# Patient Record
Sex: FEMALE | Race: WHITE
[De-identification: names, ages, dates, MRNs, and addresses within clinical notes are randomized per-mention and may not be internally consistent; named-entity substitution may affect disease eponyms.]

---

## 2019-11-14 ENCOUNTER — HOSPITAL ENCOUNTER (OUTPATIENT)
Dept: HOSPITAL 8 - STAR | Age: 39
Discharge: HOME | End: 2019-11-14
Attending: SURGERY
Payer: COMMERCIAL

## 2019-11-14 DIAGNOSIS — F17.200: ICD-10-CM

## 2019-11-14 DIAGNOSIS — F12.10: ICD-10-CM

## 2019-11-14 DIAGNOSIS — Z01.818: Primary | ICD-10-CM

## 2019-11-14 LAB
<PLATELET ESTIMATE>: ADEQUATE
ALBUMIN SERPL-MCNC: 4.3 G/DL (ref 3.4–5)
ALP SERPL-CCNC: 54 U/L (ref 45–117)
ALT SERPL-CCNC: 46 U/L (ref 12–78)
ANION GAP SERPL CALC-SCNC: 5 MMOL/L (ref 5–15)
BAND#(MANUAL): 0.07 X10^3/UL
BASOPHILS NFR BLD MANUAL: 2 % (ref 0–1)
BASOS#(MANUAL): 0.14 X10^3/UL (ref 0–0.1)
BILIRUB SERPL-MCNC: 0.4 MG/DL (ref 0.2–1)
CALCIUM SERPL-MCNC: 9 MG/DL (ref 8.5–10.1)
CHLORIDE SERPL-SCNC: 108 MMOL/L (ref 98–107)
CREAT SERPL-MCNC: 1.06 MG/DL (ref 0.55–1.02)
ERYTHROCYTE [DISTWIDTH] IN BLOOD BY AUTOMATED COUNT: 13.7 % (ref 9.6–15.2)
LYMPH#(MANUAL): 1.87 X10^3/UL (ref 1–3.4)
LYMPHS% (MANUAL): 26 % (ref 22–44)
MACROCYTES BLD QL SMEAR: (no result)
MCH RBC QN AUTO: 38.2 PG (ref 27–34.8)
MCHC RBC AUTO-ENTMCNC: 33.5 G/DL (ref 32.4–35.8)
MCV RBC AUTO: 114.2 FL (ref 80–100)
MD: YES
MONOS#(MANUAL): 0.5 X10^3/UL (ref 0.3–2.7)
MONOS% (MANUAL): 7 % (ref 2–9)
NEUTS BAND NFR BLD: 1 % (ref 0–7)
PLATELET # BLD AUTO: 270 X10^3/UL (ref 130–400)
PMV BLD AUTO: 7.3 FL (ref 7.4–10.4)
PROT SERPL-MCNC: 7.7 G/DL (ref 6.4–8.2)
RBC # BLD AUTO: 3.65 X10^6/UL (ref 3.82–5.3)
SEG#(MANUAL): 4.61 X10^3/UL (ref 1.8–6.8)
SEGS% (MANUAL): 64 % (ref 42–75)
SMALL PLATELETS BLD QL SMEAR: (no result)

## 2019-11-14 PROCEDURE — 80053 COMPREHEN METABOLIC PANEL: CPT

## 2019-11-14 PROCEDURE — 93005 ELECTROCARDIOGRAM TRACING: CPT

## 2019-11-14 PROCEDURE — 36415 COLL VENOUS BLD VENIPUNCTURE: CPT

## 2019-11-14 PROCEDURE — 85025 COMPLETE CBC W/AUTO DIFF WBC: CPT

## 2019-11-18 ENCOUNTER — HOSPITAL ENCOUNTER (OUTPATIENT)
Dept: HOSPITAL 8 - OR | Age: 39
LOS: 1 days | Discharge: HOME | End: 2019-11-19
Attending: SURGERY
Payer: COMMERCIAL

## 2019-11-18 VITALS — BODY MASS INDEX: 27.11 KG/M2 | WEIGHT: 164.69 LBS | HEIGHT: 65.5 IN

## 2019-11-18 VITALS — SYSTOLIC BLOOD PRESSURE: 115 MMHG | DIASTOLIC BLOOD PRESSURE: 78 MMHG

## 2019-11-18 VITALS — DIASTOLIC BLOOD PRESSURE: 91 MMHG | SYSTOLIC BLOOD PRESSURE: 143 MMHG

## 2019-11-18 DIAGNOSIS — F32.9: ICD-10-CM

## 2019-11-18 DIAGNOSIS — T85.79XA: Primary | ICD-10-CM

## 2019-11-18 DIAGNOSIS — J45.909: ICD-10-CM

## 2019-11-18 DIAGNOSIS — Z88.8: ICD-10-CM

## 2019-11-18 DIAGNOSIS — L02.211: ICD-10-CM
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
## 2019-11-18 LAB — HCG UR SG: 1.02 (ref 1–1.03)

## 2019-11-18 PROCEDURE — 87075 CULTR BACTERIA EXCEPT BLOOD: CPT

## 2019-11-18 PROCEDURE — 86850 RBC ANTIBODY SCREEN: CPT

## 2019-11-18 PROCEDURE — 86900 BLOOD TYPING SEROLOGIC ABO: CPT

## 2019-11-18 PROCEDURE — 36415 COLL VENOUS BLD VENIPUNCTURE: CPT

## 2019-11-18 PROCEDURE — 87102 FUNGUS ISOLATION CULTURE: CPT

## 2019-11-18 PROCEDURE — 10180 I&D COMPLEX PO WOUND INFCTJ: CPT

## 2019-11-18 PROCEDURE — 81025 URINE PREGNANCY TEST: CPT

## 2019-11-18 PROCEDURE — 11008 RMV PRSTC MTRL/MESH ABD WALL: CPT

## 2019-11-18 PROCEDURE — 88305 TISSUE EXAM BY PATHOLOGIST: CPT

## 2019-11-18 PROCEDURE — 87205 SMEAR GRAM STAIN: CPT

## 2019-11-18 PROCEDURE — 87206 SMEAR FLUORESCENT/ACID STAI: CPT

## 2019-11-18 PROCEDURE — 87070 CULTURE OTHR SPECIMN AEROBIC: CPT

## 2019-11-18 PROCEDURE — 87116 MYCOBACTERIA CULTURE: CPT

## 2019-11-18 RX ADMIN — SODIUM CHLORIDE, PRESERVATIVE FREE SCH ML: 5 INJECTION INTRAVENOUS at 21:00

## 2019-11-18 RX ADMIN — SODIUM CHLORIDE, SODIUM LACTATE, POTASSIUM CHLORIDE, CALCIUM CHLORIDE, AND DEXTROSE MONOHYDRATE SCH MLS/HR: 600; 310; 30; 20; 5 INJECTION, SOLUTION INTRAVENOUS at 21:04

## 2019-11-18 RX ADMIN — FENTANYL CITRATE PRN MCG: 50 INJECTION INTRAMUSCULAR; INTRAVENOUS at 16:39

## 2019-11-18 RX ADMIN — OXYCODONE HYDROCHLORIDE PRN MG: 5 SOLUTION ORAL at 16:53

## 2019-11-18 RX ADMIN — FENTANYL CITRATE PRN MCG: 50 INJECTION INTRAMUSCULAR; INTRAVENOUS at 16:45

## 2019-11-18 RX ADMIN — OXYCODONE HYDROCHLORIDE PRN MG: 5 SOLUTION ORAL at 16:43

## 2019-11-19 VITALS — DIASTOLIC BLOOD PRESSURE: 87 MMHG | SYSTOLIC BLOOD PRESSURE: 145 MMHG

## 2019-11-19 VITALS — SYSTOLIC BLOOD PRESSURE: 117 MMHG | DIASTOLIC BLOOD PRESSURE: 79 MMHG

## 2019-11-19 VITALS — SYSTOLIC BLOOD PRESSURE: 112 MMHG | DIASTOLIC BLOOD PRESSURE: 79 MMHG

## 2019-11-19 RX ADMIN — CEFAZOLIN SODIUM SCH MLS/HR: 2 SOLUTION INTRAVENOUS at 00:18

## 2019-11-19 RX ADMIN — SODIUM CHLORIDE, SODIUM LACTATE, POTASSIUM CHLORIDE, CALCIUM CHLORIDE, AND DEXTROSE MONOHYDRATE SCH MLS/HR: 600; 310; 30; 20; 5 INJECTION, SOLUTION INTRAVENOUS at 07:20

## 2019-11-19 RX ADMIN — CEFAZOLIN SODIUM SCH MLS/HR: 2 SOLUTION INTRAVENOUS at 07:42

## 2019-11-19 RX ADMIN — SODIUM CHLORIDE, PRESERVATIVE FREE SCH ML: 5 INJECTION INTRAVENOUS at 07:50

## 2020-03-06 ENCOUNTER — OFFICE VISIT (OUTPATIENT)
Dept: URGENT CARE | Facility: CLINIC | Age: 40
End: 2020-03-06
Payer: COMMERCIAL

## 2020-03-06 VITALS
SYSTOLIC BLOOD PRESSURE: 132 MMHG | OXYGEN SATURATION: 98 % | HEIGHT: 66 IN | RESPIRATION RATE: 14 BRPM | TEMPERATURE: 99.7 F | HEART RATE: 111 BPM | WEIGHT: 169 LBS | BODY MASS INDEX: 27.16 KG/M2 | DIASTOLIC BLOOD PRESSURE: 88 MMHG

## 2020-03-06 DIAGNOSIS — R68.89 FLU-LIKE SYMPTOMS: ICD-10-CM

## 2020-03-06 DIAGNOSIS — J02.9 SORE THROAT: ICD-10-CM

## 2020-03-06 LAB
FLUAV+FLUBV AG SPEC QL IA: NEGATIVE
INT CON NEG: NORMAL
INT CON NEG: NORMAL
INT CON POS: NORMAL
INT CON POS: NORMAL
S PYO AG THROAT QL: NEGATIVE

## 2020-03-06 PROCEDURE — 87880 STREP A ASSAY W/OPTIC: CPT | Performed by: FAMILY MEDICINE

## 2020-03-06 PROCEDURE — 87804 INFLUENZA ASSAY W/OPTIC: CPT | Performed by: FAMILY MEDICINE

## 2020-03-06 PROCEDURE — 99203 OFFICE O/P NEW LOW 30 MIN: CPT | Performed by: FAMILY MEDICINE

## 2020-03-06 RX ORDER — OSELTAMIVIR PHOSPHATE 75 MG/1
75 CAPSULE ORAL 2 TIMES DAILY
Qty: 10 CAP | Refills: 0 | Status: SHIPPED | OUTPATIENT
Start: 2020-03-06 | End: 2024-03-23

## 2020-03-06 RX ORDER — BUPROPION HYDROCHLORIDE 75 MG/1
75 TABLET ORAL 2 TIMES DAILY
COMMUNITY
End: 2024-03-23

## 2020-03-06 NOTE — LETTER
March 6, 2020         Patient: Maria G Maya   YOB: 1980   Date of Visit: 3/6/2020           To Whom it May Concern:    Maria G Maya was seen in my clinic on 3/6/2020.     If you have any questions or concerns, please don't hesitate to call.        Sincerely,           Meaghan Marr M.D.  Electronically Signed

## 2020-03-07 NOTE — PROGRESS NOTES
"Subjective:      Maria G Maya is a 40 y.o. female who presents with Flu Like Symptoms (x 2 days)            This is a new problem.  40-year-old presenting for evaluation of flulike symptoms for the past couple days.  And other family member at home had recently influenza.  She has history of mild intermittent asthma and has been uses her albuterol as needed.  Recently has not been needing to use it frequently.  Feels achy and chills, congestion and cough.      Review of Systems   All other systems reviewed and are negative.         Objective:     /88 (BP Location: Left arm, Patient Position: Sitting, BP Cuff Size: Large adult)   Pulse (!) 111   Temp 37.6 °C (99.7 °F) (Temporal)   Resp 14   Ht 1.664 m (5' 5.5\")   Wt 76.7 kg (169 lb)   SpO2 98%   Breastfeeding No   BMI 27.70 kg/m²      Physical Exam  Constitutional:       General: She is not in acute distress.     Appearance: She is not ill-appearing, toxic-appearing or diaphoretic.   HENT:      Head: Normocephalic and atraumatic.      Right Ear: Tympanic membrane, ear canal and external ear normal.      Left Ear: Tympanic membrane, ear canal and external ear normal.      Nose: Congestion present.      Mouth/Throat:      Mouth: Mucous membranes are moist.      Pharynx: Oropharynx is clear. Uvula midline. No pharyngeal swelling, oropharyngeal exudate, posterior oropharyngeal erythema or uvula swelling.      Tonsils: No tonsillar exudate or tonsillar abscesses.   Eyes:      Conjunctiva/sclera: Conjunctivae normal.   Neck:      Musculoskeletal: Neck supple.   Cardiovascular:      Rate and Rhythm: Regular rhythm. Tachycardia present.      Heart sounds: No murmur. No friction rub. No gallop.    Pulmonary:      Effort: Pulmonary effort is normal. No respiratory distress.      Breath sounds: No stridor. No wheezing, rhonchi or rales.   Lymphadenopathy:      Cervical: No cervical adenopathy.   Skin:     General: Skin is warm.      Coloration: Skin is not " jaundiced or pale.      Findings: No erythema or rash.   Neurological:      Mental Status: She is alert and oriented to person, place, and time.   Psychiatric:         Mood and Affect: Mood normal.            Results for orders placed or performed in visit on 03/06/20   POCT Rapid Strep A   Result Value Ref Range    Rapid Strep Screen Negative     Internal Control Positive Valid     Internal Control Negative Valid    POCT Influenza A/B   Result Value Ref Range    Rapid Influenza A-B Negative     Internal Control Positive Valid     Internal Control Negative Valid           Assessment/Plan:     ASSESSMENT:PLAN:  1. Flu-like symptoms  - oseltamivir (TAMIFLU) 75 MG Cap; Take 1 Cap by mouth 2 times a day.  Dispense: 10 Cap; Refill: 0    2. Sore throat  - POCT Rapid Strep A  - POCT Influenza A/B      She has a high likelihood of having influenza based on presentation and recent exposure and therefore recommend starting Tamiflu based on her history of mild intermittent asthma.  Plan per orders and instructions  Warning signs reviewed  Work/School Excuse given

## 2020-03-07 NOTE — PATIENT INSTRUCTIONS

## 2024-03-23 ENCOUNTER — APPOINTMENT (OUTPATIENT)
Dept: RADIOLOGY | Facility: MEDICAL CENTER | Age: 44
End: 2024-03-23
Attending: EMERGENCY MEDICINE
Payer: COMMERCIAL

## 2024-03-23 ENCOUNTER — HOSPITAL ENCOUNTER (EMERGENCY)
Facility: MEDICAL CENTER | Age: 44
End: 2024-03-23
Attending: EMERGENCY MEDICINE
Payer: COMMERCIAL

## 2024-03-23 VITALS
DIASTOLIC BLOOD PRESSURE: 108 MMHG | RESPIRATION RATE: 16 BRPM | HEART RATE: 80 BPM | HEIGHT: 65 IN | WEIGHT: 157.41 LBS | BODY MASS INDEX: 26.23 KG/M2 | OXYGEN SATURATION: 100 % | TEMPERATURE: 97.7 F | SYSTOLIC BLOOD PRESSURE: 154 MMHG
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
## 2024-03-23 DIAGNOSIS — S60.221A CONTUSION OF RIGHT HAND, INITIAL ENCOUNTER: ICD-10-CM

## 2024-03-23 PROCEDURE — 99284 EMERGENCY DEPT VISIT MOD MDM: CPT

## 2024-03-23 PROCEDURE — 73130 X-RAY EXAM OF HAND: CPT | Mod: RT

## 2024-03-23 RX ORDER — FERROUS SULFATE 325(65) MG
325 TABLET ORAL DAILY
COMMUNITY

## 2024-03-23 RX ORDER — IBUPROFEN 200 MG
400 TABLET ORAL
COMMUNITY

## 2024-03-23 RX ORDER — ALBUTEROL SULFATE 90 UG/1
2 AEROSOL, METERED RESPIRATORY (INHALATION) EVERY 6 HOURS PRN
COMMUNITY

## 2024-03-23 RX ORDER — ACETAMINOPHEN 500 MG
1000 TABLET ORAL
COMMUNITY

## 2024-03-23 RX ORDER — NAPROXEN SODIUM 220 MG
220 TABLET ORAL
COMMUNITY

## 2024-03-23 NOTE — ED TRIAGE NOTES
Pt presents with  from home for a mechanical GLF that occurred last night. Pt reports right hand swelling and bruising to palm. CMS intact. Pt A&Ox4 and ambulatory.   Last took naproxen last night 2100.

## 2024-03-23 NOTE — ED PROVIDER NOTES
"  ER Provider Note    Scribed for Salazar Valenzuela M.D. by Gonzalo Blankenship. 3/23/2024   9:10 AM    Primary Care Provider: Pcp Pt States None    CHIEF COMPLAINT  Chief Complaint   Patient presents with    Hand Injury     EXTERNAL RECORDS REVIEWED  Outpatient Notes Seen by [facility] in July 2023 for left thumb arthritis    HPI/ROS    LIMITATION TO HISTORY   Select: : None  OUTSIDE HISTORIAN(S):  Significant other  at bedside    Maria G Maya is a 44 y.o. female who presents to the ED for evaluation of injuries after a fall earlier today. She states that she was walking and miss a step on the curb, landing on her outstretched right hand. Since then she has noticed pain and swelling to that hand. She denies any head strike, or loss of consciousness. No other injuries.     PAST MEDICAL HISTORY  History reviewed. No pertinent past medical history.    SURGICAL HISTORY  Past Surgical History:   Procedure Laterality Date    OTHER ORTHOPEDIC SURGERY      right thumb joint replacement    UMBILICAL HERNIA REPAIR         FAMILY HISTORY  History reviewed. No pertinent family history.    SOCIAL HISTORY   reports that she has been smoking. She has never used smokeless tobacco. She reports current alcohol use. She reports that she does not currently use drugs.    CURRENT MEDICATIONS  Previous Medications    ALBUTEROL (PROVENTIL) 2.5 MG/0.5 ML NEBU SOLN    by Nebulization route ONCE (RT).    BUPROPION (WELLBUTRIN) 75 MG TAB    Take 75 mg by mouth 2 times a day.    OSELTAMIVIR (TAMIFLU) 75 MG CAP    Take 1 Cap by mouth 2 times a day.       ALLERGIES  Allergies   Allergen Reactions    Bactrim [Sulfamethoxazole W-Trimethoprim] Unspecified     Bijan sourav syndrome          PHYSICAL EXAM  BP (!) 165/115   Pulse 94   Temp 36.4 °C (97.6 °F) (Temporal)   Resp 12   Ht 1.651 m (5' 5\")   Wt 71.4 kg (157 lb 6.5 oz)   SpO2 99%   BMI 26.19 kg/m²    Nursing note and vitals reviewed.  Constitutional: Well-developed and " well-nourished. No distress.   HENT: Head is normocephalic and atraumatic. Oropharynx is clear and moist without exudate or erythema.   Eyes: Pupils are equal, round. Conjunctiva are normal.   Cardiovascular: Strong radial pulse. Capillary refill is less than 2 seconds distal to the point of injury.  Pulmonary/Chest: No respiratory distress. Chest wall is atraumatic.   Abdominal: Soft and non-tender. No distention. Atraumatic.    Musculoskeletal: Extremities exhibit normal range of motion there is tenderness bruising and swelling over the index and long finger of the right hand. Bilateral lower extremities are atraumatic. NVI distally.  Neurological: Awake, alert and oriented to person, place, and time. No focal deficits noted. Strength and sensation are normal distal to the point of injury.  Skin: Skin is warm and dry. No rash.   Psychiatric: Appropriate for clinical situation.     DIAGNOSTIC STUDIES    Radiology:   This attending emergency physician has independently interpreted the diagnostic imaging associated with this visit and is awaiting the final reading from the radiologist.   Preliminary interpretation is a follows: No fracture noted  Radiologist interpretation:   DX-HAND 3+ RIGHT   Final Result         No acute osseous abnormality.         INITIAL ASSESSMENT AND PLAN    9:10 AM - Patient was evaluated at bedside. She presents for a right hand injury after falling, no head strike or other injury. There is swelling and bruising to the region of trauma. Ordered for DX-right hand to evaluate.  Patient verbalizes understanding and support with my plan of care.      ED Observation Status? No; Patient does not meet criteria for ED Observation.      COURSE AND MEDICAL DECISION MAKING    9:43 AM - Patient was reevaluated at bedside. Discussed radiology results with the patient and informed them that there is no fracture noted. We will place her in a velcro splint and have her follow up with hand specialist in one  week for possible occult fracture. Patient will now be discharged at this time. Discussed return precautions and plan for at home care. Patient verbalizes understanding and agreement to this plan of care.             DISPOSITION AND DISCUSSIONS    I have discussed management of the patient with the following physicians and CARLEEN's:  None    Discussion of management with other QHP or appropriate source(s): None     Barriers to care at this time, including but not limited to: Patient does not have established PCP.     Decision tools and prescription drugs considered including, but not limited to: Pain Medications   .I considered prescribing narcotic pain medication, however I feel pain should be adequately controlled with over the counter NSAIDs.      DISPOSITION:  Patient will be discharged home in stable condition.    FOLLOW UP:  [facility], Emergency Dept  [address]  [place] [address] [phone]    If symptoms worsen    Luis M Pate M.D.  [address] [facility]  [place] [id number] [phone]    Schedule an appointment as soon as possible for a visit       FINAL DIAGNOSIS  1. Fall, initial encounter    2. Contusion of right hand, initial encounter         IGonzalo (Mary), am scribing for, and in the presence of, Salazar Valenzuela M.D..    Electronically signed by: Gonzalo Blankenship (Mary), 3/23/2024    ISalazar M.D. personally performed the services described in this documentation, as scribed by Gonzalo Blankenship in my presence, and it is both accurate and complete.      The note accurately reflects work and decisions made by me.  Salazar Valenzuela M.D.  3/23/2024  3:57 PM

## 2024-03-23 NOTE — ED NOTES
Pt provided with ice pack. Pillow provided for elevation. Pt is placed in position of comfort. Call light within reach.

## 2024-03-23 NOTE — ED NOTES
Dc instructions reviewed with pt and spouse including referral to hand specialist as well as establish with pcp (also referral for same) . Ice, elevate, otc meds for pain, return for worsening s/s

## 2024-03-23 NOTE — ED NOTES
Missed step from curb, fell forward and caught self with both hands. Bruising to the right side, minimal pain to the left. Denies striking head or LOC.

## 2024-08-19 ENCOUNTER — APPOINTMENT (OUTPATIENT)
Dept: MEDICAL GROUP | Facility: IMAGING CENTER | Age: 44
End: 2024-08-19
Payer: COMMERCIAL

## 2024-09-03 ENCOUNTER — APPOINTMENT (OUTPATIENT)
Dept: MEDICAL GROUP | Facility: IMAGING CENTER | Age: 44
End: 2024-09-03
Payer: COMMERCIAL

## 2024-09-03 VITALS
TEMPERATURE: 97.3 F | SYSTOLIC BLOOD PRESSURE: 150 MMHG | HEART RATE: 88 BPM | OXYGEN SATURATION: 100 % | HEIGHT: 65 IN | BODY MASS INDEX: 28.16 KG/M2 | WEIGHT: 169 LBS | DIASTOLIC BLOOD PRESSURE: 90 MMHG

## 2024-09-03 DIAGNOSIS — Z12.11 COLON CANCER SCREENING: ICD-10-CM

## 2024-09-03 DIAGNOSIS — N94.3 PMS (PREMENSTRUAL SYNDROME): ICD-10-CM

## 2024-09-03 DIAGNOSIS — Z00.00 WELLNESS EXAMINATION: ICD-10-CM

## 2024-09-03 DIAGNOSIS — I10 PRIMARY HYPERTENSION: Primary | ICD-10-CM

## 2024-09-03 DIAGNOSIS — Z12.4 SCREENING FOR CERVICAL CANCER: ICD-10-CM

## 2024-09-03 DIAGNOSIS — E66.3 OVERWEIGHT (BMI 25.0-29.9): ICD-10-CM

## 2024-09-03 DIAGNOSIS — N95.1 PERIMENOPAUSAL SYMPTOM: ICD-10-CM

## 2024-09-03 DIAGNOSIS — Z12.31 ENCOUNTER FOR SCREENING MAMMOGRAM FOR MALIGNANT NEOPLASM OF BREAST: ICD-10-CM

## 2024-09-03 DIAGNOSIS — R73.01 IMPAIRED FASTING BLOOD SUGAR: ICD-10-CM

## 2024-09-03 DIAGNOSIS — Z13.79 GENETIC TESTING: ICD-10-CM

## 2024-09-03 DIAGNOSIS — Z11.59 NEED FOR HEPATITIS C SCREENING TEST: ICD-10-CM

## 2024-09-03 DIAGNOSIS — Z23 NEED FOR VACCINATION: ICD-10-CM

## 2024-09-03 DIAGNOSIS — Z11.4 ENCOUNTER FOR SCREENING FOR HUMAN IMMUNODEFICIENCY VIRUS (HIV): ICD-10-CM

## 2024-09-03 DIAGNOSIS — Z97.5 IUD (INTRAUTERINE DEVICE) IN PLACE: ICD-10-CM

## 2024-09-03 PROCEDURE — 90715 TDAP VACCINE 7 YRS/> IM: CPT

## 2024-09-03 PROCEDURE — 3077F SYST BP >= 140 MM HG: CPT

## 2024-09-03 PROCEDURE — 90472 IMMUNIZATION ADMIN EACH ADD: CPT

## 2024-09-03 PROCEDURE — 99204 OFFICE O/P NEW MOD 45 MIN: CPT | Mod: 25

## 2024-09-03 PROCEDURE — 3080F DIAST BP >= 90 MM HG: CPT

## 2024-09-03 PROCEDURE — 90746 HEPB VACCINE 3 DOSE ADULT IM: CPT | Mod: JZ

## 2024-09-03 PROCEDURE — 90471 IMMUNIZATION ADMIN: CPT

## 2024-09-03 RX ORDER — OLMESARTAN MEDOXOMIL 20 MG/1
20 TABLET ORAL DAILY
Qty: 30 TABLET | Refills: 1 | Status: SHIPPED | OUTPATIENT
Start: 2024-09-03

## 2024-09-03 RX ORDER — SERTRALINE HYDROCHLORIDE 25 MG/1
25 TABLET, FILM COATED ORAL DAILY
Qty: 30 TABLET | Refills: 3 | Status: SHIPPED | OUTPATIENT
Start: 2024-09-03

## 2024-09-03 ASSESSMENT — ANXIETY QUESTIONNAIRES
3. WORRYING TOO MUCH ABOUT DIFFERENT THINGS: SEVERAL DAYS
6. BECOMING EASILY ANNOYED OR IRRITABLE: SEVERAL DAYS
GAD7 TOTAL SCORE: 8
4. TROUBLE RELAXING: SEVERAL DAYS
2. NOT BEING ABLE TO STOP OR CONTROL WORRYING: SEVERAL DAYS
5. BEING SO RESTLESS THAT IT IS HARD TO SIT STILL: NEARLY EVERY DAY
1. FEELING NERVOUS, ANXIOUS, OR ON EDGE: NOT AT ALL
7. FEELING AFRAID AS IF SOMETHING AWFUL MIGHT HAPPEN: SEVERAL DAYS

## 2024-09-03 ASSESSMENT — PATIENT HEALTH QUESTIONNAIRE - PHQ9
CLINICAL INTERPRETATION OF PHQ2 SCORE: 2
5. POOR APPETITE OR OVEREATING: 2 - MORE THAN HALF THE DAYS
SUM OF ALL RESPONSES TO PHQ QUESTIONS 1-9: 9

## 2024-09-03 NOTE — PROGRESS NOTES
Chief Complaint   Patient presents with    Establish Care    Hypertension    Other     Pre-mensurate syndrome x 6 mos    Pt report putting on weight 50 lbs     HISTORY OF THE PRESENT ILLNESS: Patient is a 44 y.o. female. This pleasant patient is here today to establish care and to discuss:    To establish care  No Previous PCP     Primary hypertension  New issue. She eats healthy, home cooked meals. She is active at work as a  but no regular exercise.   She does vape nicotine and THC  She drinks 8-12 wine a week.     Perimenopause  PMS  Onset of symptoms started 6 months ago. Pt reports symptoms of weight gain, irregular menses, severe depression and very emotional around menstrual cycle.   She is requesting referral to gynecology and to start drug therapy.   She reports PMS symptoms is starting to affect her relationship.         9/3/2024     2:40 PM   PHQ-9 Screening   Little interest or pleasure in doing things 1 - several days   Feeling down, depressed, or hopeless 1 - several days   Trouble falling or staying asleep, or sleeping too much 2 - more than half the days   Feeling tired or having little energy 0 - not at all   Poor appetite or overeating 2 - more than half the days   Feeling bad about yourself - or that you are a failure or have let yourself or your family down 1 - several days   Trouble concentrating on things, such as reading the newspaper or watching television 0 - not at all   Moving or speaking so slowly that other people could have noticed. Or the opposite - being so fidgety or restless that you have been moving around a lot more than usual 1 - several days   Thoughts that you would be better off dead, or of hurting yourself in some way 1 - several days   PHQ-2 Total Score 2   PHQ-9 Total Score 9       Interpretation of PHQ-9 Total Score   Score Severity   1-4 No Depression   5-9 Mild Depression   10-14 Moderate Depression   15-19 Moderately Severe Depression   20-27 Severe Depression          9/3/2024     3:04 PM    LILLI-7 ANXIETY SCALE FLOWSHEET   Feeling nervous, anxious, or on edge 0   Not being able to stop or control worrying 1   Worrying too much about different things 1   Trouble relaxing 1   Being so restless that it is hard to sit still 3   Becoming easily annoyed or irritable 1   Feeling afraid as if something awful might happen 1   LILLI-7 Total Score 8       Interpretation of LILLI-7 Total Score   Score Severity   0-4 Minimal Anxiety  5-9 Mild Anxiety   10-14 Moderate Anxiety  15-21 Severy Anxiety       Allergies: Bactrim [sulfamethoxazole w-trimethoprim]    Current Outpatient Medications Ordered in Epic   Medication Sig Dispense Refill    olmesartan (BENICAR) 20 MG Tab Take 1 Tablet by mouth every day. 30 Tablet 1    sertraline (ZOLOFT) 25 MG tablet Take 1 Tablet by mouth every day. 30 Tablet 3    diclofenac sodium (VOLTAREN) 1 % Gel Apply 4 g topically 3 times a day as needed (pain).      acetaminophen (TYLENOL) 500 MG Tab Take 1,000 mg by mouth 1 time a day as needed for Moderate Pain.      ibuprofen (MOTRIN) 200 MG Tab Take 400 mg by mouth 1 time a day as needed for Mild Pain.      naproxen (ALEVE) 220 MG tablet Take 220 mg by mouth 1 time a day as needed (pain).      multivitamin Tab Take 1 Tablet by mouth every day.      albuterol 108 (90 Base) MCG/ACT Aero Soln inhalation aerosol Inhale 2 Puffs every 6 hours as needed for Shortness of Breath.      CALCIUM PO Take 1 Tablet by mouth every day. (Patient not taking: Reported on 9/3/2024)      ferrous sulfate 325 (65 Fe) MG tablet Take 325 mg by mouth every day. (Patient not taking: Reported on 9/3/2024)       No current [facility]-ordered facility-administered medications on file.       History reviewed. No pertinent past medical history.    Past Surgical History:   Procedure Laterality Date    OTHER ORTHOPEDIC SURGERY      right thumb joint replacement    UMBILICAL HERNIA REPAIR         Social History     Tobacco Use    Smoking status:  "Former     Types: Cigarettes     Start date: 1/1/2024    Smokeless tobacco: Never   Vaping Use    Vaping status: Every Day    Substances: Nicotine, THC, CBD, Flavoring    Devices: Pre-filled or refillable cartridge   Substance Use Topics    Alcohol use: Yes     Comment: 8 -12 weekly    Drug use: Not Currently     Types: Marijuana, Inhaled       Social History     Social History Narrative    Not on file       Family History   Problem Relation Age of Onset    Cancer Maternal Grandfather        ROS:     - Constitutional: Negative for fever, chills, unexpected weight change, and fatigue/generalized weakness.     - HEENT: Negative for headaches, vision changes, hearing changes, ear pain, ear discharge, rhinorrhea, sinus congestion, sore throat, and neck pain.      - Respiratory: Negative for cough, sputum production, chest congestion, dyspnea, wheezing, and crackles.      - Cardiovascular: Negative for chest pain, palpitations, orthopnea, and bilateral lower extremity edema.     - Gastrointestinal: Negative for heartburn, nausea, vomiting, abdominal pain, hematochezia, melena, diarrhea, constipation, and greasy/foul-smelling stools.     - Genitourinary: Negative for dysuria, polyuria, hematuria, pyuria, urinary urgency, and urinary incontinence.     - Musculoskeletal: Negative for myalgias, back pain, and joint pain.     - Skin: Negative for rash, itching, cyanotic skin color change.     - Neurological: Negative for dizziness, tingling, tremors, focal sensory deficit, focal weakness and headaches.     - Endo/Heme/Allergies: Does not bruise/bleed easily.     - Psychiatric/Behavioral: Negative for  suicidal/homicidal ideation and memory loss.          Exam: BP (!) 150/90 (BP Location: Left arm, Patient Position: Sitting, BP Cuff Size: Adult)   Pulse 88   Temp 36.3 °C (97.3 °F) (Temporal)   Ht 1.651 m (5' 5\")   Wt 76.7 kg (169 lb)   SpO2 100%  Body mass index is 28.12 kg/m².    General: Normal appearing. No " distress.  HEENT: Normocephalic. Eyes conjunctiva clear lids without ptosis, pupils equal and reactive to light accommodation, ears normal shape and contour, canals are clear bilaterally, tympanic membranes are benign, nasal mucosa benign, oropharynx is without erythema, edema or exudates.   Neck: Supple without JVD or bruit. Thyroid is not enlarged.  Pulmonary: Clear to ausculation.  Normal effort. No rales, ronchi, or wheezing.  Cardiovascular: Regular rate and rhythm without murmur. Carotid and radial pulses are intact and equal bilaterally.  Abdomen: Soft, nontender, nondistended. Normal bowel sounds. Liver and spleen are not palpable  Neurologic: Grossly nonfocal  Lymph: No cervical, supraclavicular or axillary lymph nodes are palpable  Skin: Warm and dry.  No obvious lesions.  Musculoskeletal: Normal gait. No extremity cyanosis, clubbing, or edema.  Psych: Normal mood and affect. Alert and oriented x3. Judgment and insight is normal.    Please note that this dictation was created using voice recognition software. I have made every reasonable attempt to correct obvious errors, but I expect that there are errors of grammar and possibly content that I did not discover before finalizing the note.      Assessment/Plan    44 y.o. female with the following -    1. Wellness examination  PMH/PSH/FH/Social history reviewed. Medication reconciled. Vaccinations discussed. Previous records and labs reviewed. Discussed age appropriate anticipatory guidance.  Will screen for anemia, thyroid disorder, metabolic disorder, cardiovascular disease, diabetes, and vitamin deficiency. Will order labs.    - CBC WITH DIFFERENTIAL; Future  - Comp Metabolic Panel; Future  - Lipid Profile; Future  - TSH WITH REFLEX TO FT4; Future  - VITAMIN D,25 HYDROXY (DEFICIENCY); Future  - HEMOGLOBIN A1C; Future    2. Overweight (BMI 25.0-29.9)  To have successful weight loss, a comprehensive lifestyle intervention that include a combination of diet,  exercise, and behavioral modification is important.    Start today with diet changes: Reduce or eliminate carbonated drinks/sweetened drinks, food/drink with high fructose corn syrup, and processed foods.  Increase protein intake, 1 grams of protein per kg of body weight, increase fiber intake through vegetables, fresh fruits, and increase water intake  increase physical activity and start an exercise regimen. Adults should engage in at least         150 minutes/week of moderate-intensity or 75 minutes/week of vigorous-intensity physical         activity including resistance exercise     - CBC WITH DIFFERENTIAL; Future  - Comp Metabolic Panel; Future  - Lipid Profile; Future  - TSH WITH REFLEX TO FT4; Future  - VITAMIN D,25 HYDROXY (DEFICIENCY); Future  - HEMOGLOBIN A1C; Future    3. Primary hypertension  New dx. Will treat with olmesartan for BP control. Med administration and side effects discussed.   Recommend to monitor BP and keep log. BP goal >100/50 <140/80  S/s hypotension discussed.  Recommend therapeutic lifestyle changes that include healthy diet that is rich in vegetables, fruits, fiber-rich whole grains, lean meats, poultry and fish, low in saturated and trans fats, cholesterol, sodium, and added sugars (DASH and Mediterranean diet).   Regular exercise at least 30 minutes 5 times a week.   Weight reduction if applicable (aim for 5% to 10% body weight increments).   Smoking cessation. Limit alcohol consumption.   Stress-reduction techniques such as meditation.   Practice good sleep hygiene.     - CBC WITH DIFFERENTIAL; Future  - Comp Metabolic Panel; Future  - Lipid Profile; Future  - TSH WITH REFLEX TO FT4; Future  - HEMOGLOBIN A1C; Future  - MICROALBUMIN CREAT RATIO URINE; Future  - olmesartan (BENICAR) 20 MG Tab; Take 1 Tablet by mouth every day.  Dispense: 30 Tablet; Refill: 1    4. Perimenopausal symptom  6. PMS (premenstrual syndrome)  Uncontrolled condition.Will start on low dose SSRI sertraline,  side effects such as GI upset discussed. Advised that effects of medication may take from 4 to 8 weeks to manifest.  Advised to seek immediate medical attention for following symptoms:   -may increase risk of suicidal thinking or behavior during initial treatment  -anxiety, agitation, confusion, delirium, hyperreflexia, muscle rigidity, myoclonus, tachycardia, tachypnea, and tremor.  Will refer to gynecology.     - Referral to Gynecology  - VITAMIN D,25 HYDROXY (DEFICIENCY); Future  - sertraline (ZOLOFT) 25 MG tablet; Take 1 Tablet by mouth every day.  Dispense: 30 Tablet; Refill: 3    5. Screening for cervical cancer    - Referral to Gynecology      7. Need for vaccination    - TDAP VACCINE =>8YO IM  - Hepatitis B Vaccine Adult IM    8. Need for hepatitis C screening test    - HEP C VIRUS ANTIBODY; Future    9. IUD (intrauterine device) in place    - Referral to Gynecology    10. Colon cancer screening    - COLOGUARD (FIT DNA)    11. Encounter for screening for human immunodeficiency virus (HIV)    - HIV AG/AB COMBO ASSAY SCREENING; Future    12. Impaired fasting blood sugar    - Comp Metabolic Panel; Future  - Lipid Profile; Future  - HEMOGLOBIN A1C; Future    13. Genetic testing    - Referral to Genetic Research Studies    14. Encounter for screening mammogram for malignant neoplasm of breast    - MA-SCREENING MAMMO BILAT W/TOMOSYNTHESIS W/CAD; Future    Referral for genetic research was offered. Patient accepted    Medical Decision Making/Course:  In the course of preparing for this visit with review of the pertinent past medical history, recent and past clinic visits, current medications, and performing chart, immunization, medical history and medication reconciliation, and in the further course of obtaining the current history pertinent to the clinic visit today, performing an exam and evaluation, ordering and independently evaluating labs, tests, and/or procedures, prescribing any recommended new medications  as noted above, providing any pertinent counseling and education and recommending further coordination of care. This was discussed with patient in a shared-decision making conversation, and they understand and agreed with plan of care.     Return in about 4 weeks (around 10/1/2024) for HTN f/u, med check, f/u labs.    Thank you, Nicolle GONG  [facility]      Please note that this dictation was created using voice recognition software. I have made every reasonable attempt to correct obvious errors, but I expect that there are errors of grammar and possibly content that I did not discover before finalizing the note.

## 2024-09-18 ENCOUNTER — APPOINTMENT (OUTPATIENT)
Dept: RADIOLOGY | Facility: MEDICAL CENTER | Age: 44
End: 2024-09-18
Payer: COMMERCIAL

## 2024-09-18 DIAGNOSIS — Z12.31 ENCOUNTER FOR SCREENING MAMMOGRAM FOR MALIGNANT NEOPLASM OF BREAST: ICD-10-CM

## 2024-09-18 PROCEDURE — 77067 SCR MAMMO BI INCL CAD: CPT

## 2024-09-26 ENCOUNTER — HOSPITAL ENCOUNTER (OUTPATIENT)
Dept: RADIOLOGY | Facility: MEDICAL CENTER | Age: 44
End: 2024-09-26

## 2024-10-01 ENCOUNTER — HOSPITAL ENCOUNTER (OUTPATIENT)
Dept: LAB | Facility: MEDICAL CENTER | Age: 44
End: 2024-10-01
Payer: COMMERCIAL

## 2024-10-01 ENCOUNTER — APPOINTMENT (OUTPATIENT)
Dept: MEDICAL GROUP | Facility: IMAGING CENTER | Age: 44
End: 2024-10-01
Payer: COMMERCIAL

## 2024-10-01 VITALS
HEART RATE: 84 BPM | DIASTOLIC BLOOD PRESSURE: 70 MMHG | TEMPERATURE: 98 F | WEIGHT: 165 LBS | OXYGEN SATURATION: 100 % | RESPIRATION RATE: 16 BRPM | SYSTOLIC BLOOD PRESSURE: 114 MMHG | HEIGHT: 65 IN | BODY MASS INDEX: 27.49 KG/M2

## 2024-10-01 DIAGNOSIS — E66.3 OVERWEIGHT (BMI 25.0-29.9): ICD-10-CM

## 2024-10-01 DIAGNOSIS — Z11.59 NEED FOR HEPATITIS C SCREENING TEST: ICD-10-CM

## 2024-10-01 DIAGNOSIS — N95.1 PERIMENOPAUSAL SYMPTOM: ICD-10-CM

## 2024-10-01 DIAGNOSIS — Z00.00 WELLNESS EXAMINATION: ICD-10-CM

## 2024-10-01 DIAGNOSIS — R73.01 IMPAIRED FASTING BLOOD SUGAR: ICD-10-CM

## 2024-10-01 DIAGNOSIS — D75.89 MACROCYTOSIS WITHOUT ANEMIA: ICD-10-CM

## 2024-10-01 DIAGNOSIS — R80.9 POSITIVE FOR MACROALBUMINURIA: ICD-10-CM

## 2024-10-01 DIAGNOSIS — I10 PRIMARY HYPERTENSION: ICD-10-CM

## 2024-10-01 DIAGNOSIS — Z11.4 ENCOUNTER FOR SCREENING FOR HUMAN IMMUNODEFICIENCY VIRUS (HIV): ICD-10-CM

## 2024-10-01 DIAGNOSIS — E78.00 PURE HYPERCHOLESTEROLEMIA: ICD-10-CM

## 2024-10-01 DIAGNOSIS — Z78.9 ALCOHOL USE: ICD-10-CM

## 2024-10-01 DIAGNOSIS — N94.3 PMS (PREMENSTRUAL SYNDROME): ICD-10-CM

## 2024-10-01 LAB
25(OH)D3 SERPL-MCNC: 35 NG/ML (ref 30–100)
ALBUMIN SERPL BCP-MCNC: 4.7 G/DL (ref 3.2–4.9)
ALBUMIN/GLOB SERPL: 1.8 G/DL
ALP SERPL-CCNC: 51 U/L (ref 30–99)
ALT SERPL-CCNC: 25 U/L (ref 2–50)
ANION GAP SERPL CALC-SCNC: 16 MMOL/L (ref 7–16)
AST SERPL-CCNC: 29 U/L (ref 12–45)
BASOPHILS # BLD AUTO: 0.5 % (ref 0–1.8)
BASOPHILS # BLD: 0.02 K/UL (ref 0–0.12)
BILIRUB SERPL-MCNC: 0.5 MG/DL (ref 0.1–1.5)
BUN SERPL-MCNC: 10 MG/DL (ref 8–22)
CALCIUM ALBUM COR SERPL-MCNC: 8.4 MG/DL (ref 8.5–10.5)
CALCIUM SERPL-MCNC: 9 MG/DL (ref 8.4–10.2)
CHLORIDE SERPL-SCNC: 102 MMOL/L (ref 96–112)
CHOLEST SERPL-MCNC: 215 MG/DL (ref 100–199)
CO2 SERPL-SCNC: 18 MMOL/L (ref 20–33)
CREAT SERPL-MCNC: 0.73 MG/DL (ref 0.5–1.4)
CREAT UR-MCNC: 233 MG/DL
EOSINOPHIL # BLD AUTO: 0.11 K/UL (ref 0–0.51)
EOSINOPHIL NFR BLD: 2.8 % (ref 0–6.9)
ERYTHROCYTE [DISTWIDTH] IN BLOOD BY AUTOMATED COUNT: 47.4 FL (ref 35.9–50)
EST. AVERAGE GLUCOSE BLD GHB EST-MCNC: 111 MG/DL
FASTING STATUS PATIENT QL REPORTED: NORMAL
GFR SERPLBLD CREATININE-BSD FMLA CKD-EPI: 102 ML/MIN/1.73 M 2
GLOBULIN SER CALC-MCNC: 2.6 G/DL (ref 1.9–3.5)
GLUCOSE SERPL-MCNC: 89 MG/DL (ref 65–99)
HBA1C MFR BLD: 5.5 % (ref 4–5.6)
HCT VFR BLD AUTO: 40.2 % (ref 37–47)
HCV AB SER QL: NORMAL
HDLC SERPL-MCNC: 85 MG/DL
HGB BLD-MCNC: 13.7 G/DL (ref 12–16)
HIV 1+2 AB+HIV1 P24 AG SERPL QL IA: NORMAL
IMM GRANULOCYTES # BLD AUTO: 0.01 K/UL (ref 0–0.11)
IMM GRANULOCYTES NFR BLD AUTO: 0.3 % (ref 0–0.9)
LDLC SERPL CALC-MCNC: 106 MG/DL
LYMPHOCYTES # BLD AUTO: 1.16 K/UL (ref 1–4.8)
LYMPHOCYTES NFR BLD: 29.5 % (ref 22–41)
MCH RBC QN AUTO: 36.3 PG (ref 27–33)
MCHC RBC AUTO-ENTMCNC: 34.1 G/DL (ref 32.2–35.5)
MCV RBC AUTO: 106.6 FL (ref 81.4–97.8)
MICROALBUMIN UR-MCNC: 11.1 MG/DL
MICROALBUMIN/CREAT UR: 48 MG/G (ref 0–30)
MONOCYTES # BLD AUTO: 0.36 K/UL (ref 0–0.85)
MONOCYTES NFR BLD AUTO: 9.2 % (ref 0–13.4)
NEUTROPHILS # BLD AUTO: 2.27 K/UL (ref 1.82–7.42)
NEUTROPHILS NFR BLD: 57.7 % (ref 44–72)
NRBC # BLD AUTO: 0 K/UL
NRBC BLD-RTO: 0 /100 WBC (ref 0–0.2)
PLATELET # BLD AUTO: 251 K/UL (ref 164–446)
PMV BLD AUTO: 8.6 FL (ref 9–12.9)
POTASSIUM SERPL-SCNC: 4.2 MMOL/L (ref 3.6–5.5)
PROT SERPL-MCNC: 7.3 G/DL (ref 6–8.2)
RBC # BLD AUTO: 3.77 M/UL (ref 4.2–5.4)
SODIUM SERPL-SCNC: 136 MMOL/L (ref 135–145)
TRIGL SERPL-MCNC: 121 MG/DL (ref 0–149)
TSH SERPL DL<=0.005 MIU/L-ACNC: 3.23 UIU/ML (ref 0.38–5.33)
WBC # BLD AUTO: 3.9 K/UL (ref 4.8–10.8)

## 2024-10-01 PROCEDURE — 86803 HEPATITIS C AB TEST: CPT

## 2024-10-01 PROCEDURE — 1126F AMNT PAIN NOTED NONE PRSNT: CPT

## 2024-10-01 PROCEDURE — 87389 HIV-1 AG W/HIV-1&-2 AB AG IA: CPT

## 2024-10-01 PROCEDURE — 82570 ASSAY OF URINE CREATININE: CPT

## 2024-10-01 PROCEDURE — 36415 COLL VENOUS BLD VENIPUNCTURE: CPT

## 2024-10-01 PROCEDURE — 83036 HEMOGLOBIN GLYCOSYLATED A1C: CPT

## 2024-10-01 PROCEDURE — 3078F DIAST BP <80 MM HG: CPT

## 2024-10-01 PROCEDURE — 80061 LIPID PANEL: CPT

## 2024-10-01 PROCEDURE — 80053 COMPREHEN METABOLIC PANEL: CPT

## 2024-10-01 PROCEDURE — 85025 COMPLETE CBC W/AUTO DIFF WBC: CPT

## 2024-10-01 PROCEDURE — 3074F SYST BP LT 130 MM HG: CPT

## 2024-10-01 PROCEDURE — 84443 ASSAY THYROID STIM HORMONE: CPT

## 2024-10-01 PROCEDURE — 82306 VITAMIN D 25 HYDROXY: CPT

## 2024-10-01 PROCEDURE — 82043 UR ALBUMIN QUANTITATIVE: CPT

## 2024-10-01 PROCEDURE — 99214 OFFICE O/P EST MOD 30 MIN: CPT

## 2024-10-01 RX ORDER — OLMESARTAN MEDOXOMIL 20 MG/1
20 TABLET ORAL DAILY
Qty: 90 TABLET | Refills: 3 | Status: SHIPPED | OUTPATIENT
Start: 2024-10-01

## 2024-10-01 RX ORDER — SERTRALINE HYDROCHLORIDE 25 MG/1
25 TABLET, FILM COATED ORAL DAILY
Qty: 90 TABLET | Refills: 3 | Status: SHIPPED | OUTPATIENT
Start: 2024-10-01

## 2024-10-01 ASSESSMENT — FIBROSIS 4 INDEX: FIB4 SCORE: 1.06

## 2024-10-01 ASSESSMENT — PAIN SCALES - GENERAL: PAINLEVEL: NO PAIN

## 2025-01-29 DIAGNOSIS — N94.3 PMS (PREMENSTRUAL SYNDROME): ICD-10-CM

## 2025-01-29 DIAGNOSIS — N95.1 PERIMENOPAUSAL SYMPTOM: ICD-10-CM

## 2025-01-29 DIAGNOSIS — I10 PRIMARY HYPERTENSION: ICD-10-CM

## 2025-01-29 RX ORDER — OLMESARTAN MEDOXOMIL 20 MG/1
20 TABLET ORAL DAILY
Qty: 30 TABLET | Refills: 0 | Status: SHIPPED | OUTPATIENT
Start: 2025-01-29

## 2025-01-29 RX ORDER — SERTRALINE HYDROCHLORIDE 25 MG/1
25 TABLET, FILM COATED ORAL DAILY
Qty: 30 TABLET | Refills: 0 | Status: SHIPPED | OUTPATIENT
Start: 2025-01-29

## 2025-01-29 NOTE — TELEPHONE ENCOUNTER
Received request via: Patient    Was the patient seen in the last year in this department? Yes    Does the patient have an active prescription (recently filled or refills available) for medication(s) requested? No    [facility] [id number]    Does the patient have alf Plus and need 100-day supply? (This applies to ALL medications) Patient does not have SCP

## 2025-05-30 DIAGNOSIS — N94.3 PMS (PREMENSTRUAL SYNDROME): ICD-10-CM

## 2025-05-30 DIAGNOSIS — N95.1 PERIMENOPAUSAL SYMPTOM: ICD-10-CM

## 2025-05-30 DIAGNOSIS — I10 PRIMARY HYPERTENSION: ICD-10-CM

## 2025-05-30 NOTE — TELEPHONE ENCOUNTER
Received request via: Patient    Was the patient seen in the last year in this department? Yes    Does the patient have an active prescription (recently filled or refills available) for medication(s) requested? No    Pharmacy Name: [facility] [id number]    Does the patient have USP Plus and need 100-day supply? (This applies to ALL medications) Patient does not have SCP

## 2025-05-30 NOTE — TELEPHONE ENCOUNTER
Received request via: Patient    Was the patient seen in the last year in this department? Yes    Does the patient have an active prescription (recently filled or refills available) for medication(s) requested? No    Pharmacy Name: [facility] [id number]     Does the patient have care home Plus and need 100-day supply? (This applies to ALL medications) Patient does not have SCP

## 2025-06-02 RX ORDER — SERTRALINE HYDROCHLORIDE 25 MG/1
25 TABLET, FILM COATED ORAL DAILY
Qty: 90 TABLET | Refills: 0 | Status: SHIPPED | OUTPATIENT
Start: 2025-06-02

## 2025-06-02 RX ORDER — OLMESARTAN MEDOXOMIL 20 MG/1
20 TABLET ORAL DAILY
Qty: 30 TABLET | Refills: 0 | Status: SHIPPED | OUTPATIENT
Start: 2025-06-02

## 2025-06-06 ENCOUNTER — OFFICE VISIT (OUTPATIENT)
Dept: MEDICAL GROUP | Facility: MEDICAL CENTER | Age: 45
End: 2025-06-06
Payer: COMMERCIAL

## 2025-06-06 VITALS
BODY MASS INDEX: 25.25 KG/M2 | SYSTOLIC BLOOD PRESSURE: 142 MMHG | HEIGHT: 66 IN | WEIGHT: 157.1 LBS | OXYGEN SATURATION: 99 % | DIASTOLIC BLOOD PRESSURE: 72 MMHG | TEMPERATURE: 98.7 F | HEART RATE: 82 BPM

## 2025-06-06 DIAGNOSIS — U07.1 COVID-19: Primary | ICD-10-CM

## 2025-06-06 PROCEDURE — 3077F SYST BP >= 140 MM HG: CPT | Performed by: FAMILY MEDICINE

## 2025-06-06 PROCEDURE — 99213 OFFICE O/P EST LOW 20 MIN: CPT | Performed by: FAMILY MEDICINE

## 2025-06-06 PROCEDURE — 3078F DIAST BP <80 MM HG: CPT | Performed by: FAMILY MEDICINE

## 2025-06-06 RX ORDER — LEVONORGESTREL 52 MG/1
INTRAUTERINE DEVICE INTRAUTERINE
COMMUNITY

## 2025-06-06 ASSESSMENT — PATIENT HEALTH QUESTIONNAIRE - PHQ9: CLINICAL INTERPRETATION OF PHQ2 SCORE: 0

## 2025-06-06 ASSESSMENT — FIBROSIS 4 INDEX: FIB4 SCORE: 1.09

## 2025-06-06 NOTE — PROGRESS NOTES
Verbal consent was acquired by the patient to use [employer] listening note generation during this visit:  Yes      Chief complaint::The encounter diagnosis was COVID-19.    Assessment and Plan:   The following treatment plan was discussed:     Assessment & Plan  1. COVID-19: Acute.  - Tested positive for COVID-19 this morning after experiencing sore throat, headache, and malaise. Normal kidney function. Discussed potential side effects of Paxlovid (nausea, vomiting, diarrhea). Paxlovid prescribed to lessen symptom severity and prevent hospitalization. Duration: 5 days.  - Advised to take Paxlovid with food to minimize GI side effects.  - Advised to drink fluids, rest, and avoid contact with others.  - Over-the-counter medications like Sudafed can be used for congestion, but monitor blood pressure due to hypertension.  - Prescription for Paxlovid sent to pharmacy.  - Stop medication if unwanted side effects develop.    2. Hypertension: Chronic.  Stable  - History of hypertension.  - Monitor blood pressure closely while taking over-the-counter medications like Sudafed for congestion.  - Discussed importance of monitoring blood pressure due to potential exacerbation from over-the-counter medications.    Follow-up  - Follow up as needed.  Maria G was seen today for covid home monitoring, pharyngitis and new med request.    Diagnoses and all orders for this visit:    COVID-19  -     Nirmatrelvir&Ritonavir 300/100 20 x 150 MG & 10 x 100MG Tablet Therapy Pack; Take 300 mg nirmatrelvir (two 150 mg tablets) with 100 mg ritonavir (one 100 mg tablet) by mouth, with all three tablets taken together twice daily for 5 days.        Followup: Return if symptoms worsen or fail to improve.    Subjective/HPI:     HPI:    Maria G Maya is a pleasant 45 y.o. female here for   Chief Complaint   Patient presents with    Covid Home Monitoring     Tested positive yesterday    Pharyngitis     Headache, nasal congestion.  "Started this morning     New Med Request     paxlovid        History of Present Illness  The patient presents for evaluation of COVID-19.    COVID-19 Exposure  - Exposed to COVID-19 through their spouse, who tested positive on 06/05/2025.  - Initially asymptomatic, tested negative that night.  - Retested on 06/06/2025 due to mouth breathing and tested positive.    Current Symptoms  - Currently experiencing sore throat, headache, and mild malaise.  - Desires Paxlovid treatment.    First Encounter  - First encounter with COVID-19.    Supplemental information: History of hypertension.    Current Medicines (including changes today)  Current Medications[1]  Past Medical/ Surgical History  She  has no past medical history on file.  She  has a past surgical history that includes other orthopedic surgery and umbilical hernia repair.       Objective:   BP (!) 142/72 (BP Location: Right arm, Patient Position: Sitting, BP Cuff Size: Adult)   Pulse 82   Temp 37.1 °C (98.7 °F) (Temporal)   Ht 1.67 m (5' 5.75\")   Wt 71.3 kg (157 lb 1.6 oz)   SpO2 99%  Body mass index is 25.55 kg/m².    Physical exam was made by observation   Physical Exam  Constitutional:       General: She is not in acute distress.     Appearance: She is not ill-appearing or toxic-appearing.   HENT:      Head: Normocephalic.      Right Ear: Tympanic membrane and external ear normal.      Left Ear: Tympanic membrane and external ear normal.      Nose: Nose normal. No rhinorrhea.      Mouth/Throat:      Mouth: Mucous membranes are moist.      Pharynx: Oropharynx is clear. No posterior oropharyngeal erythema.   Eyes:      General:         Right eye: No discharge.         Left eye: No discharge.      Conjunctiva/sclera: Conjunctivae normal.      Pupils: Pupils are equal, round, and reactive to light.   Cardiovascular:      Rate and Rhythm: Normal rate and regular rhythm.      Heart sounds: No murmur heard.  Pulmonary:      Effort: Pulmonary effort is normal. No " respiratory distress.      Breath sounds: Normal breath sounds. No wheezing.   Abdominal:      General: Abdomen is flat.   Musculoskeletal:         General: No swelling or tenderness.      Cervical back: Normal range of motion and neck supple.      Right lower leg: No edema.      Left lower leg: No edema.   Skin:     General: Skin is warm.   Neurological:      General: No focal deficit present.      Mental Status: She is alert and oriented to person, place, and time. Mental status is at baseline.   Psychiatric:         Mood and Affect: Mood normal.          Lab/ Imaging Results:  No labs or imaging to review    Please note that this dictation was created using voice recognition software. I have made every reasonable attempt to correct obvious errors, but I expect that there are errors of grammar and possibly content that I did not discover before finalizing the note.           [1]   Current Outpatient Medications   Medication Sig Dispense Refill    Nirmatrelvir&Ritonavir 300/100 20 x 150 MG & 10 x 100MG Tablet Therapy Pack Take 300 mg nirmatrelvir (two 150 mg tablets) with 100 mg ritonavir (one 100 mg tablet) by mouth, with all three tablets taken together twice daily for 5 days. 30 Each 0    levonorgestrel (MIRENA, 52 MG,) 20 MCG/DAY IUD as directed Intrauterine      olmesartan (BENICAR) 20 MG Tab Take 1 Tablet by mouth every day. 30 Tablet 0    sertraline (ZOLOFT) 25 MG tablet Take 1 Tablet by mouth every day. 90 Tablet 0    diclofenac sodium (VOLTAREN) 1 % Gel Apply 4 g topically 3 times a day as needed (pain).      acetaminophen (TYLENOL) 500 MG Tab Take 1,000 mg by mouth 1 time a day as needed for Moderate Pain.      ibuprofen (MOTRIN) 200 MG Tab Take 400 mg by mouth 1 time a day as needed for Mild Pain.      naproxen (ALEVE) 220 MG tablet Take 220 mg by mouth 1 time a day as needed (pain).      multivitamin Tab Take 1 Tablet by mouth every day.      CALCIUM PO Take 1 Tablet by mouth every day. (Patient not  taking: Reported on 9/3/2024)      ferrous sulfate 325 (65 Fe) MG tablet Take 325 mg by mouth every day. (Patient not taking: Reported on 9/3/2024)      albuterol 108 (90 Base) MCG/ACT Aero Soln inhalation aerosol Inhale 2 Puffs every 6 hours as needed for Shortness of Breath.       No current facility-administered medications for this visit.

## 2025-06-20 ENCOUNTER — OFFICE VISIT (OUTPATIENT)
Dept: MEDICAL GROUP | Facility: MEDICAL CENTER | Age: 45
End: 2025-06-20
Payer: COMMERCIAL

## 2025-06-20 VITALS
BODY MASS INDEX: 25.51 KG/M2 | TEMPERATURE: 98.7 F | HEART RATE: 71 BPM | OXYGEN SATURATION: 99 % | DIASTOLIC BLOOD PRESSURE: 90 MMHG | SYSTOLIC BLOOD PRESSURE: 160 MMHG | HEIGHT: 66 IN | WEIGHT: 158.73 LBS

## 2025-06-20 DIAGNOSIS — I10 PRIMARY HYPERTENSION: Primary | ICD-10-CM

## 2025-06-20 DIAGNOSIS — J01.40 ACUTE NON-RECURRENT PANSINUSITIS: ICD-10-CM

## 2025-06-20 PROCEDURE — 3080F DIAST BP >= 90 MM HG: CPT | Performed by: FAMILY MEDICINE

## 2025-06-20 PROCEDURE — 99214 OFFICE O/P EST MOD 30 MIN: CPT | Performed by: FAMILY MEDICINE

## 2025-06-20 PROCEDURE — 3077F SYST BP >= 140 MM HG: CPT | Performed by: FAMILY MEDICINE

## 2025-06-20 RX ORDER — METHYLPREDNISOLONE 4 MG/1
TABLET ORAL
Qty: 21 TABLET | Refills: 0 | Status: SHIPPED | OUTPATIENT
Start: 2025-06-20

## 2025-06-20 RX ORDER — DOXYCYCLINE HYCLATE 100 MG
100 TABLET ORAL 2 TIMES DAILY
Qty: 14 TABLET | Refills: 0 | Status: SHIPPED | OUTPATIENT
Start: 2025-06-20 | End: 2025-06-27

## 2025-06-20 ASSESSMENT — ENCOUNTER SYMPTOMS
COUGH: 1
CHILLS: 0
PALPITATIONS: 0
SINUS PAIN: 1
FEVER: 0

## 2025-06-20 ASSESSMENT — FIBROSIS 4 INDEX: FIB4 SCORE: 1.09

## 2025-06-20 NOTE — PROGRESS NOTES
Verbal consent was acquired by the patient to use [employer] listening note generation during this visit.      Maria G was seen today for follow-up.    Diagnoses and all orders for this visit:    Primary hypertension    Acute non-recurrent pansinusitis  -     doxycycline (VIBRAMYCIN) 100 MG Tab; Take 1 Tablet by mouth 2 times a day for 7 days.  -     methylPREDNISolone (MEDROL DOSEPAK) 4 MG Tablet Therapy Pack; As directed on the packaging label.                  Assessment & Plan  1.  Acute pansinusitis  New condition, unstable  - Tested positive for COVID-19 two weeks ago and treated with Paxlovid, resulting in a negative test after four days.  - Persistent sinus pressure and congestion suggestive of bacterial sinus infection.  - Increased pain and limited mobility.  - Prescription for doxycycline, one tablet twice daily.  - Steroid pack to be taken over six days (6 tablets on the first day, decreasing by one tablet each day).    2.  Hypertension  Chronic condition, unstable, she has not taken her blood pressure medication olmesartan today.  Currently asymptomatic  - Blood pressure significantly elevated today, likely due to stress, recent intake of Mucinex DM, and multiple cups of coffee.  - Advised to monitor blood pressure at home and consult primary care physician if it remains high.  - Will take blood pressure medication immediately upon returning home.    3. Health maintenance:  - Due for colon cancer screening and advised to complete the Cologuard test when possible.    Follow-up with PCP for blood pressure          Chief complaint::The primary encounter diagnosis was Primary hypertension. A diagnosis of Acute non-recurrent pansinusitis was also pertinent to this visit.      History of Present Illness  The patient is a 45-year-old female who presents for evaluation of persistent upper respiratory symptoms.    COVID-19  - Diagnosed with COVID-19 two weeks ago  - Prescribed Paxlovid  - Condition  "improved and tested negative after four days  - Continues to experience symptoms, including sinus pressure, ear pain, crackling, nasal discomfort, dull headache, green mucus.    Blood Pressure Medication  - Plans to take her blood pressure medication upon returning home    Symptom Management  - Reports no chest pain, palpitations, or shortness of breath  - Managing symptoms with Mucinex DM  - Consumed several cups of coffee today    Supplemental information: She is due for colon cancer screening.      Review of Systems   Constitutional:  Negative for chills and fever.   HENT:  Positive for congestion and sinus pain. Negative for ear pain.    Respiratory:  Positive for cough.    Cardiovascular:  Negative for chest pain, palpitations and leg swelling.          Medications and Allergies:       Current Outpatient Medications:     doxycycline, 100 mg, Oral, BID, Taking    methylPREDNISolone, As directed on the packaging label., Taking    Mirena (52 MG), as directed Intrauterine, Taking    olmesartan, 20 mg, Oral, DAILY, Taking    sertraline, 25 mg, Oral, DAILY, Taking    diclofenac sodium, 4 g, Topical, TID PRN, Taking As Needed    acetaminophen, 1,000 mg, Oral, QDAY PRN, Taking As Needed    ibuprofen, 400 mg, Oral, QDAY PRN, Taking As Needed    naproxen, 220 mg, Oral, QDAY PRN, Taking As Needed    multivitamin, 1 Tablet, Oral, DAILY, Taking    albuterol, 2 Puff, Inhalation, Q6HRS PRN, Taking As Needed    Nirmatrelvir&Ritonavir 300/100, Take 300 mg nirmatrelvir (two 150 mg tablets) with 100 mg ritonavir (one 100 mg tablet) by mouth, with all three tablets taken together twice daily for 5 days.     BP (!) 160/90   Pulse 71   Temp 37.1 °C (98.7 °F) (Temporal)   Ht 1.67 m (5' 5.75\")   Wt 72 kg (158 lb 11.7 oz)   SpO2 99% , Body mass index is 25.82 kg/m².      Physical Exam  Constitutional:       Appearance: Normal appearance. She is well-developed and well-groomed.   HENT:      Head: Normocephalic and atraumatic.      " Nose: Congestion present.      Mouth/Throat:      Pharynx: Posterior oropharyngeal erythema present.   Eyes:      General:         Right eye: No discharge.         Left eye: No discharge.      Conjunctiva/sclera: Conjunctivae normal.   Cardiovascular:      Rate and Rhythm: Normal rate and regular rhythm.      Heart sounds: Normal heart sounds. No murmur heard.     No friction rub. No gallop.   Pulmonary:      Effort: Pulmonary effort is normal. No respiratory distress.      Breath sounds: Normal breath sounds. No wheezing or rales.   Neurological:      General: No focal deficit present.      Mental Status: She is alert. Mental status is at baseline.      Gait: Gait is intact.   Psychiatric:         Mood and Affect: Mood and affect normal.         Behavior: Behavior normal.                Please note that this dictation was created using voice recognition software. I have made every reasonable attempt to correct obvious errors, but I expect that there are errors of grammar and possibly content that I did not discover before finalizing the note.

## 2025-08-05 DIAGNOSIS — I10 PRIMARY HYPERTENSION: ICD-10-CM

## 2025-08-05 RX ORDER — OLMESARTAN MEDOXOMIL 20 MG/1
20 TABLET ORAL DAILY
Qty: 90 TABLET | Refills: 1 | Status: SHIPPED | OUTPATIENT
Start: 2025-08-05